# Patient Record
Sex: FEMALE | URBAN - METROPOLITAN AREA
[De-identification: names, ages, dates, MRNs, and addresses within clinical notes are randomized per-mention and may not be internally consistent; named-entity substitution may affect disease eponyms.]

---

## 2023-08-21 ENCOUNTER — WEB ENCOUNTER (OUTPATIENT)
Dept: URBAN - METROPOLITAN AREA CLINIC 54 | Facility: CLINIC | Age: 43
End: 2023-08-21

## 2023-08-23 ENCOUNTER — OFFICE VISIT (OUTPATIENT)
Dept: URBAN - METROPOLITAN AREA CLINIC 54 | Facility: CLINIC | Age: 43
End: 2023-08-23
Payer: COMMERCIAL

## 2023-08-23 ENCOUNTER — LAB OUTSIDE AN ENCOUNTER (OUTPATIENT)
Dept: URBAN - METROPOLITAN AREA CLINIC 54 | Facility: CLINIC | Age: 43
End: 2023-08-23

## 2023-08-23 ENCOUNTER — WEB ENCOUNTER (OUTPATIENT)
Dept: URBAN - METROPOLITAN AREA CLINIC 54 | Facility: CLINIC | Age: 43
End: 2023-08-23

## 2023-08-23 VITALS
BODY MASS INDEX: 28.97 KG/M2 | SYSTOLIC BLOOD PRESSURE: 112 MMHG | HEIGHT: 62 IN | HEART RATE: 72 BPM | WEIGHT: 157.4 LBS | TEMPERATURE: 97.1 F | DIASTOLIC BLOOD PRESSURE: 77 MMHG

## 2023-08-23 DIAGNOSIS — R19.7 ACUTE DIARRHEA: ICD-10-CM

## 2023-08-23 DIAGNOSIS — K59.01 SLOW TRANSIT CONSTIPATION: ICD-10-CM

## 2023-08-23 DIAGNOSIS — K21.9 GASTROESOPHAGEAL REFLUX DISEASE, UNSPECIFIED WHETHER ESOPHAGITIS PRESENT: ICD-10-CM

## 2023-08-23 PROBLEM — 35298007: Status: ACTIVE | Noted: 2023-08-23

## 2023-08-23 PROBLEM — 235595009: Status: ACTIVE | Noted: 2023-08-23

## 2023-08-23 PROCEDURE — 99215 OFFICE O/P EST HI 40 MIN: CPT | Performed by: PHYSICIAN ASSISTANT

## 2023-08-23 RX ORDER — TOPIRAMATE 100 MG/1
1 TABLET TABLET, FILM COATED ORAL ONCE A DAY
Status: ON HOLD | COMMUNITY

## 2023-08-23 RX ORDER — DICLOFENAC SODIUM 75 MG/1
1 TABLET AS NEEDED TABLET, DELAYED RELEASE ORAL TWICE A DAY
Status: ACTIVE | COMMUNITY

## 2023-08-23 RX ORDER — ACETAMINOPHEN,PHENIRAMINE MALEATE, PHENYLEPHRINE HYDROCHLORIDE 650; 20; 10 MG/15000MG; MG/15000MG; MG/15000MG
150 ML POWDER, FOR SUSPENSION ORAL ONCE
Qty: 1 | Refills: 0 | OUTPATIENT
Start: 2023-08-23 | End: 2023-08-24

## 2023-08-23 RX ORDER — SOD SULF/POT CHLORIDE/MAG SULF 1.479 G
12 TABLETS THE FIRST DOSE THE EVENING BEFORE AND SECOND DOSE THE MORNING OF COLONOSCOPY TABLET ORAL TWICE A DAY
Qty: 24 | Refills: 0 | OUTPATIENT
Start: 2023-08-23 | End: 2023-08-24

## 2023-08-23 RX ORDER — ALBUTEROL SULFATE 90 UG/1
1 PUFF AS NEEDED AEROSOL, METERED RESPIRATORY (INHALATION)
Status: ACTIVE | COMMUNITY

## 2023-08-23 RX ORDER — EPINEPHRINE 0.3 MG/.3ML
AS DIRECTED INJECTION INTRAMUSCULAR; SUBCUTANEOUS
Status: ACTIVE | COMMUNITY

## 2023-08-23 RX ORDER — VALACYCLOVIR HYDROCHLORIDE 500 MG/1
1 TABLET TABLET, FILM COATED ORAL ONCE A DAY
Status: ACTIVE | COMMUNITY

## 2023-08-23 RX ORDER — ONDANSETRON 8 MG/1
1 TABLET ON THE TONGUE AND ALLOW TO DISSOLVE  AS NEEDED TABLET, ORALLY DISINTEGRATING ORAL ONCE A DAY
Status: ACTIVE | COMMUNITY

## 2023-08-23 NOTE — HPI-TODAY'S VISIT:
Edelmira Childers is a 42-year old female pt with PMH of epilepsy,  asthma, pancreatitis, who presents to clinic today for constipation with abdominal CT imaging and possible colitis. Abdominal pain with nausea that prompted her to urgent care. Labs were unremarkable. CT abd/pelvis:  Large colonic fecal burden with mild thickening of the transverse segment, may reflect mild infectious/inflammatory colitis. Patient was given cipro & flagyl. She completed course of antibiotics and steroids and a few days later had hives with change in voice. She recieved epipen inj and was observed in [facility] overnight wihtout issues. Here today for follow up of the above events.

## 2023-09-07 ENCOUNTER — TELEPHONE ENCOUNTER (OUTPATIENT)
Dept: URBAN - METROPOLITAN AREA CLINIC 54 | Facility: CLINIC | Age: 43
End: 2023-09-07

## 2023-09-13 ENCOUNTER — OFFICE VISIT (OUTPATIENT)
Dept: URBAN - METROPOLITAN AREA MEDICAL CENTER 24 | Facility: MEDICAL CENTER | Age: 43
End: 2023-09-13
Payer: COMMERCIAL

## 2023-09-13 DIAGNOSIS — R93.3 ABN FINDINGS-GI TRACT: ICD-10-CM

## 2023-09-13 DIAGNOSIS — R12 BURNING REFLUX: ICD-10-CM

## 2023-09-13 PROCEDURE — 45378 DIAGNOSTIC COLONOSCOPY: CPT | Performed by: INTERNAL MEDICINE

## 2023-09-13 PROCEDURE — 43235 EGD DIAGNOSTIC BRUSH WASH: CPT | Performed by: INTERNAL MEDICINE

## 2023-09-13 RX ORDER — EPINEPHRINE 0.3 MG/.3ML
AS DIRECTED INJECTION INTRAMUSCULAR; SUBCUTANEOUS
Status: ACTIVE | COMMUNITY

## 2023-09-13 RX ORDER — VALACYCLOVIR HYDROCHLORIDE 500 MG/1
1 TABLET TABLET, FILM COATED ORAL ONCE A DAY
Status: ACTIVE | COMMUNITY

## 2023-09-13 RX ORDER — DICLOFENAC SODIUM 75 MG/1
1 TABLET AS NEEDED TABLET, DELAYED RELEASE ORAL TWICE A DAY
Status: ACTIVE | COMMUNITY

## 2023-09-13 RX ORDER — ONDANSETRON 8 MG/1
1 TABLET ON THE TONGUE AND ALLOW TO DISSOLVE  AS NEEDED TABLET, ORALLY DISINTEGRATING ORAL ONCE A DAY
Status: ACTIVE | COMMUNITY

## 2023-09-13 RX ORDER — ALBUTEROL SULFATE 90 UG/1
1 PUFF AS NEEDED AEROSOL, METERED RESPIRATORY (INHALATION)
Status: ACTIVE | COMMUNITY

## 2023-09-13 RX ORDER — TOPIRAMATE 100 MG/1
1 TABLET TABLET, FILM COATED ORAL ONCE A DAY
Status: ON HOLD | COMMUNITY

## 2023-09-14 ENCOUNTER — OFFICE VISIT (OUTPATIENT)
Dept: URBAN - METROPOLITAN AREA SURGERY CENTER 14 | Facility: SURGERY CENTER | Age: 43
End: 2023-09-14

## 2023-10-03 ENCOUNTER — OFFICE VISIT (OUTPATIENT)
Dept: URBAN - METROPOLITAN AREA CLINIC 54 | Facility: CLINIC | Age: 43
End: 2023-10-03

## 2023-10-03 RX ORDER — ALBUTEROL SULFATE 90 UG/1
1 PUFF AS NEEDED AEROSOL, METERED RESPIRATORY (INHALATION)
COMMUNITY

## 2023-10-03 RX ORDER — VALACYCLOVIR HYDROCHLORIDE 500 MG/1
1 TABLET TABLET, FILM COATED ORAL ONCE A DAY
COMMUNITY

## 2023-10-03 RX ORDER — ONDANSETRON 8 MG/1
1 TABLET ON THE TONGUE AND ALLOW TO DISSOLVE  AS NEEDED TABLET, ORALLY DISINTEGRATING ORAL ONCE A DAY
COMMUNITY

## 2023-10-03 RX ORDER — EPINEPHRINE 0.3 MG/.3ML
AS DIRECTED INJECTION INTRAMUSCULAR; SUBCUTANEOUS
COMMUNITY

## 2023-10-03 RX ORDER — DICLOFENAC SODIUM 75 MG/1
1 TABLET AS NEEDED TABLET, DELAYED RELEASE ORAL TWICE A DAY
COMMUNITY

## 2023-10-03 RX ORDER — TOPIRAMATE 100 MG/1
1 TABLET TABLET, FILM COATED ORAL ONCE A DAY
COMMUNITY

## 2023-10-03 NOTE — HPI-TODAY'S VISIT:
Edelmira Childers is a 42-year old female pt with PMH of epilepsy,  asthma, pancreatitis, who presents to clinic today for constipation with abdominal CT imaging and possible colitis. Abdominal pain with nausea that prompted her to urgent care. Labs were unremarkable. CT abd/pelvis:  Large colonic fecal burden with mild thickening of the transverse segment, may reflect mild infectious/inflammatory colitis. Patient was given cipro & flagyl. She completed course of antibiotics and steroids and a few days later had hives with change in voice. She recieved epipen inj and was observed in [facility] overnight wihtout issues. Here today for follow up of the above events.  10/3/23: Patient here for endoscopic follow-up.  She underwent EGD and colonoscopy with Dr. Green that showed normal EGD.  Esophageal and gastric biopsies show mild inflammation.  Negative for metaplasia, dysplasia, or H. pylori.  Colonoscopy was normal and recommended to repeat colonoscopy in 10 years for screening purposes.

## 2023-10-09 ENCOUNTER — OFFICE VISIT (OUTPATIENT)
Dept: URBAN - METROPOLITAN AREA CLINIC 54 | Facility: CLINIC | Age: 43
End: 2023-10-09
Payer: COMMERCIAL

## 2023-10-09 VITALS
WEIGHT: 161.4 LBS | SYSTOLIC BLOOD PRESSURE: 116 MMHG | HEIGHT: 62 IN | HEART RATE: 70 BPM | DIASTOLIC BLOOD PRESSURE: 73 MMHG | TEMPERATURE: 97.2 F | BODY MASS INDEX: 29.7 KG/M2

## 2023-10-09 DIAGNOSIS — K21.9 GASTROESOPHAGEAL REFLUX DISEASE, UNSPECIFIED WHETHER ESOPHAGITIS PRESENT: ICD-10-CM

## 2023-10-09 DIAGNOSIS — K59.09 CHANGE IN BOWEL MOVEMENTS INTERMITTENT CONSTIPATION. URGENCY IN THE MORNING.: ICD-10-CM

## 2023-10-09 DIAGNOSIS — R19.7 ACUTE DIARRHEA: ICD-10-CM

## 2023-10-09 PROBLEM — 82934008: Status: ACTIVE | Noted: 2023-10-09

## 2023-10-09 PROCEDURE — 99212 OFFICE O/P EST SF 10 MIN: CPT | Performed by: PHYSICIAN ASSISTANT

## 2023-10-09 RX ORDER — ONDANSETRON 8 MG/1
1 TABLET ON THE TONGUE AND ALLOW TO DISSOLVE  AS NEEDED TABLET, ORALLY DISINTEGRATING ORAL ONCE A DAY
Status: ACTIVE | COMMUNITY

## 2023-10-09 RX ORDER — DICLOFENAC SODIUM 75 MG/1
1 TABLET AS NEEDED TABLET, DELAYED RELEASE ORAL TWICE A DAY
Status: ACTIVE | COMMUNITY

## 2023-10-09 RX ORDER — VALACYCLOVIR HYDROCHLORIDE 500 MG/1
1 TABLET TABLET, FILM COATED ORAL ONCE A DAY
Status: ACTIVE | COMMUNITY

## 2023-10-09 RX ORDER — ALBUTEROL SULFATE 90 UG/1
1 PUFF AS NEEDED AEROSOL, METERED RESPIRATORY (INHALATION)
Status: ACTIVE | COMMUNITY

## 2023-10-09 RX ORDER — EPINEPHRINE 0.3 MG/.3ML
AS DIRECTED INJECTION INTRAMUSCULAR; SUBCUTANEOUS
Status: ACTIVE | COMMUNITY

## 2023-10-09 RX ORDER — PANTOPRAZOLE SODIUM 40 MG/1
1 TABLET TABLET, DELAYED RELEASE ORAL ONCE A DAY
Qty: 30 | OUTPATIENT
Start: 2023-10-09

## 2023-10-09 RX ORDER — TOPIRAMATE 100 MG/1
1 TABLET TABLET, FILM COATED ORAL ONCE A DAY
Status: ACTIVE | COMMUNITY

## 2023-10-09 NOTE — HPI-TODAY'S VISIT:
Edelmira Childers is a 42-year old female pt with PMH of epilepsy,  asthma, pancreatitis, who presents to clinic today for constipation with abdominal CT imaging and possible colitis. Abdominal pain with nausea that prompted her to urgent care. Labs were unremarkable. CT abd/pelvis:  Large colonic fecal burden with mild thickening of the transverse segment, may reflect mild infectious/inflammatory colitis. Patient was given cipro & flagyl. She completed course of antibiotics and steroids and a few days later had hives with change in voice. She recieved epipen inj and was observed in [facility] overnight wihtout issues. Here today for follow up of the above events.  10/9/23: Patient here for endoscopic follow-up.  She underwent EGD and colonoscopy with Dr. Green that showed normal EGD.  Esophageal and gastric biopsies show mild inflammation.  Negative for metaplasia, dysplasia, or H. pylori.  Colonoscopy was normal and recommended to repeat colonoscopy in 10 years for screening purposes.  Today, patient reports improvement  in constipation with daily MiraLAX.  She does report constipation reported presents with increased fiber.  She is currently on a 1200-calorie restriction by her provider and states she has significantly increased her vegetables at once and increased water intake.  She did require stimulant laxatives 1.5 weeks ago.  She reports taking omeprazole daily with minimal improvement in reflux.  Frequent throat clearing during OV today.  No esophagitis noted on EGD.  On NSAIDs for OA.

## 2023-11-08 ENCOUNTER — ERX REFILL RESPONSE (OUTPATIENT)
Dept: URBAN - NONMETROPOLITAN AREA CLINIC 4 | Facility: CLINIC | Age: 43
End: 2023-11-08

## 2023-11-08 RX ORDER — PANTOPRAZOLE SODIUM 40 MG/1
TAKE 1 TABLET BY MOUTH EVERY DAY TABLET, DELAYED RELEASE ORAL
Qty: 90 TABLET | Refills: 2 | OUTPATIENT

## 2023-11-08 RX ORDER — PANTOPRAZOLE SODIUM 40 MG/1
1 TABLET TABLET, DELAYED RELEASE ORAL ONCE A DAY
Qty: 30 | OUTPATIENT

## 2024-04-09 ENCOUNTER — OV EP (OUTPATIENT)
Dept: URBAN - METROPOLITAN AREA CLINIC 54 | Facility: CLINIC | Age: 44
End: 2024-04-09
Payer: COMMERCIAL

## 2024-04-09 VITALS
WEIGHT: 147 LBS | SYSTOLIC BLOOD PRESSURE: 117 MMHG | HEIGHT: 62 IN | TEMPERATURE: 97.3 F | BODY MASS INDEX: 27.05 KG/M2 | HEART RATE: 77 BPM | DIASTOLIC BLOOD PRESSURE: 83 MMHG

## 2024-04-09 DIAGNOSIS — K52.89 (LYMPHOCYTIC) MICROSCOPIC COLITIS: ICD-10-CM

## 2024-04-09 DIAGNOSIS — K21.9 GASTROESOPHAGEAL REFLUX DISEASE, UNSPECIFIED WHETHER ESOPHAGITIS PRESENT: ICD-10-CM

## 2024-04-09 DIAGNOSIS — K59.09 CHANGE IN BOWEL MOVEMENTS INTERMITTENT CONSTIPATION. URGENCY IN THE MORNING.: ICD-10-CM

## 2024-04-09 PROCEDURE — 99214 OFFICE O/P EST MOD 30 MIN: CPT | Performed by: PHYSICIAN ASSISTANT

## 2024-04-09 RX ORDER — VALACYCLOVIR HYDROCHLORIDE 500 MG/1
1 TABLET TABLET, FILM COATED ORAL ONCE A DAY
Status: ACTIVE | COMMUNITY

## 2024-04-09 RX ORDER — ALBUTEROL SULFATE 90 UG/1
1 PUFF AS NEEDED AEROSOL, METERED RESPIRATORY (INHALATION)
Status: ACTIVE | COMMUNITY

## 2024-04-09 RX ORDER — PANTOPRAZOLE SODIUM 40 MG/1
TAKE 1 TABLET BY MOUTH EVERY DAY TABLET, DELAYED RELEASE ORAL
Qty: 90 TABLET | Refills: 2 | Status: ACTIVE | COMMUNITY

## 2024-04-09 RX ORDER — ONDANSETRON 8 MG/1
1 TABLET ON THE TONGUE AND ALLOW TO DISSOLVE  AS NEEDED TABLET, ORALLY DISINTEGRATING ORAL ONCE A DAY
Status: ACTIVE | COMMUNITY

## 2024-04-09 RX ORDER — EPINEPHRINE 0.3 MG/.3ML
AS DIRECTED INJECTION INTRAMUSCULAR; SUBCUTANEOUS
Status: ACTIVE | COMMUNITY

## 2024-04-09 RX ORDER — PHENTERMINE AND TOPIRAMATE 11.25; 69 MG/1; MG/1
1 CAPSULE CAPSULE, EXTENDED RELEASE ORAL ONCE A DAY
Status: ACTIVE | COMMUNITY

## 2024-04-09 RX ORDER — PANTOPRAZOLE SODIUM 40 MG/1
1 TABLET TABLET, DELAYED RELEASE ORAL ONCE A DAY
Qty: 30 | Refills: 5 | OUTPATIENT

## 2024-04-09 RX ORDER — HYOSCYAMINE SULFATE 0.12 MG/1
1 TABLET AS NEEDED TABLET ORAL THREE TIMES A DAY
Qty: 90 TABLET | Refills: 3 | OUTPATIENT
Start: 2024-04-09 | End: 2024-08-07

## 2024-04-09 NOTE — HPI-TODAY'S VISIT:
Edelmira Childers is a 42-year old female pt with PMH of epilepsy,  asthma, pancreatitis, who presents to clinic today for constipation with abdominal CT imaging and possible colitis. Abdominal pain with nausea that prompted her to urgent care. Labs were unremarkable. CT abd/pelvis:  Large colonic fecal burden with mild thickening of the transverse segment, may reflect mild infectious/inflammatory colitis. Patient was given cipro & flagyl. She completed course of antibiotics and steroids and a few days later had hives with change in voice. She recieved epipen inj and was observed in [facility] overnight wihtout issues. Here today for follow up of the above events.  10/9/23: Patient here for endoscopic follow-up.  She underwent EGD and colonoscopy with Dr. Green that showed normal EGD.  Esophageal and gastric biopsies show mild inflammation.  Negative for metaplasia, dysplasia, or H. pylori.  Colonoscopy was normal and recommended to repeat colonoscopy in 10 years for screening purposes.  Today, patient reports improvement  in constipation with daily MiraLAX.  She does report constipation reported presents with increased fiber.  She is currently on a 1200-calorie restriction by her provider and states she has significantly increased her vegetables at once and increased water intake.  She did require stimulant laxatives 1.5 weeks ago.  She reports taking omeprazole daily with minimal improvement in reflux.  Frequent throat clearing during OV today.  No esophagitis noted on EGD.  On NSAIDs for OA.  Follow Up 4/9/24: Patient presents for routine follow up. Significant improvement with pantoprazole 40 QD. She complains of alternating between constipation and diarrhea. She takes Metamucil and 1 cap of MiraLAX intermittently during times of constipation. She also notes occasional epigastric pain that radiates through to her back.  Patient states this is typically triggered by opiates and particular foods.  1 episode in 2019 lasted 3 days which prompted her to her PCP who noted significantly elevated transaminases but normal lipase.  MRCP obtained 2 months later without significant findings.

## 2024-08-26 ENCOUNTER — P2P PATIENT RECORD (OUTPATIENT)
Age: 44
End: 2024-08-26

## 2024-09-05 ENCOUNTER — TELEPHONE ENCOUNTER (OUTPATIENT)
Dept: URBAN - METROPOLITAN AREA CLINIC 82 | Facility: CLINIC | Age: 44
End: 2024-09-05

## 2024-09-09 ENCOUNTER — DASHBOARD ENCOUNTERS (OUTPATIENT)
Age: 44
End: 2024-09-09

## 2024-09-09 ENCOUNTER — OFFICE VISIT (OUTPATIENT)
Dept: URBAN - METROPOLITAN AREA CLINIC 54 | Facility: CLINIC | Age: 44
End: 2024-09-09
Payer: COMMERCIAL

## 2024-09-09 ENCOUNTER — LAB OUTSIDE AN ENCOUNTER (OUTPATIENT)
Dept: URBAN - METROPOLITAN AREA CLINIC 54 | Facility: CLINIC | Age: 44
End: 2024-09-09

## 2024-09-09 VITALS
HEIGHT: 62 IN | BODY MASS INDEX: 25.54 KG/M2 | SYSTOLIC BLOOD PRESSURE: 118 MMHG | HEART RATE: 81 BPM | DIASTOLIC BLOOD PRESSURE: 77 MMHG | WEIGHT: 138.8 LBS | TEMPERATURE: 97.3 F

## 2024-09-09 DIAGNOSIS — K59.04 CHRONIC IDIOPATHIC CONSTIPATION: ICD-10-CM

## 2024-09-09 DIAGNOSIS — K52.9 COLITIS: ICD-10-CM

## 2024-09-09 DIAGNOSIS — R10.13 EPIGASTRIC PAIN: ICD-10-CM

## 2024-09-09 DIAGNOSIS — K21.9 GASTROESOPHAGEAL REFLUX DISEASE, UNSPECIFIED WHETHER ESOPHAGITIS PRESENT: ICD-10-CM

## 2024-09-09 PROCEDURE — 99214 OFFICE O/P EST MOD 30 MIN: CPT | Performed by: PHYSICIAN ASSISTANT

## 2024-09-09 RX ORDER — PANTOPRAZOLE SODIUM 40 MG/1
1 TABLET TABLET, DELAYED RELEASE ORAL ONCE A DAY
Qty: 30 | Refills: 5 | OUTPATIENT

## 2024-09-09 RX ORDER — CHLORHEXIDINE GLUCONATE 4 %
AS DIRECTED LIQUID (ML) TOPICAL
Status: ACTIVE | COMMUNITY

## 2024-09-09 RX ORDER — ONDANSETRON 8 MG/1
1 TABLET ON THE TONGUE AND ALLOW TO DISSOLVE  AS NEEDED TABLET, ORALLY DISINTEGRATING ORAL ONCE A DAY
Status: ACTIVE | COMMUNITY

## 2024-09-09 RX ORDER — EPINEPHRINE 0.3 MG/.3ML
AS DIRECTED INJECTION INTRAMUSCULAR; SUBCUTANEOUS
Status: ACTIVE | COMMUNITY

## 2024-09-09 RX ORDER — VALACYCLOVIR HYDROCHLORIDE 500 MG/1
1 TABLET TABLET, FILM COATED ORAL ONCE A DAY
Status: ACTIVE | COMMUNITY

## 2024-09-09 RX ORDER — ALBUTEROL SULFATE 90 UG/1
1 PUFF AS NEEDED AEROSOL, METERED RESPIRATORY (INHALATION)
Status: ACTIVE | COMMUNITY

## 2024-09-09 RX ORDER — PHENTERMINE AND TOPIRAMATE 11.25; 69 MG/1; MG/1
1 CAPSULE CAPSULE, EXTENDED RELEASE ORAL ONCE A DAY
Status: ACTIVE | COMMUNITY

## 2024-09-09 RX ORDER — HYOSCYAMINE SULFATE 0.12 MG/1
1 TABLET AS NEEDED TABLET ORAL
Status: ACTIVE | COMMUNITY

## 2024-09-09 RX ORDER — PANTOPRAZOLE SODIUM 40 MG/1
TAKE 1 TABLET BY MOUTH EVERY DAY TABLET, DELAYED RELEASE ORAL
Qty: 90 TABLET | Refills: 2 | Status: ACTIVE | COMMUNITY

## 2024-09-09 RX ORDER — LACTULOSE 10 G/15ML
15 ML AS NEEDED SOLUTION ORAL ONCE A DAY
Status: ACTIVE | COMMUNITY

## 2024-09-09 NOTE — HPI-TODAY'S VISIT:
Edelmira Childers is a 42-year old female pt with PMH of epilepsy,  asthma, pancreatitis, who presents to clinic today for constipation with abdominal CT imaging and possible colitis. Abdominal pain with nausea that prompted her to urgent care. Labs were unremarkable. CT abd/pelvis:  Large colonic fecal burden with mild thickening of the transverse segment, may reflect mild infectious/inflammatory colitis. Patient was given cipro & flagyl. She completed course of antibiotics and steroids and a few days later had hives with change in voice. She recieved epipen inj and was observed in [facility] overnight wihtout issues. Here today for follow up of the above events.  10/9/23: Patient here for endoscopic follow-up.  She underwent EGD and colonoscopy with Dr. Green that showed normal EGD.  Esophageal and gastric biopsies show mild inflammation.  Negative for metaplasia, dysplasia, or H. pylori.  Colonoscopy was normal and recommended to repeat colonoscopy in 10 years for screening purposes.  Today, patient reports improvement  in constipation with daily MiraLAX.  She does report constipation reported presents with increased fiber.  She is currently on a 1200-calorie restriction by her provider and states she has significantly increased her vegetables at once and increased water intake.  She did require stimulant laxatives 1.5 weeks ago.  She reports taking omeprazole daily with minimal improvement in reflux.  Frequent throat clearing during OV today.  No esophagitis noted on EGD.  On NSAIDs for OA.  Follow Up 4/9/24: Patient presents for routine follow up. Significant improvement with pantoprazole 40 QD. She complains of alternating between constipation and diarrhea. She takes Metamucil and 1 cap of MiraLAX intermittently during times of constipation. She also notes occasional epigastric pain that radiates through to her back.  Patient states this is typically triggered by opiates and particular foods.  1 episode in 2019 lasted 3 days which prompted her to her PCP who noted significantly elevated transaminases but normal lipase.  MRCP obtained 2 months later without significant findings.  Follow Up 9/9/24: Pt presents for routine follow up. Pt reports doing well over the last few months. A few days prior to arrival, she had epigastric pain that radiates to the back. She took antispasmodic with alleviation of pain in approx 30 minutes. Symptoms are improving. Pt is currently taking acetaminophen, dextromethorphan, guafenesiin, phenylephrine. Remains on PPI. Now with regular BMs.

## 2024-09-14 LAB
ALBUMIN: 4.5
ALKALINE PHOSPHATASE: 53
ALT (SGPT): 28
AST (SGOT): 13
BASO (ABSOLUTE): 0
BASOS: 0
BILIRUBIN, TOTAL: 0.5
BUN/CREATININE RATIO: 14
BUN: 11
CALCIUM: 9.3
CARBON DIOXIDE, TOTAL: 18
CHLORIDE: 106
CREATININE: 0.77
EGFR: 97
EOS (ABSOLUTE): 0.1
EOS: 2
GLOBULIN, TOTAL: 2.5
GLUCOSE: 74
HEMATOCRIT: 43.4
HEMATOLOGY COMMENTS:: (no result)
HEMOGLOBIN: 14.6
IMMATURE CELLS: (no result)
IMMATURE GRANS (ABS): 0
IMMATURE GRANULOCYTES: 0
LIPASE: 17
LYMPHS (ABSOLUTE): 2.5
LYMPHS: 31
MCH: 31.1
MCHC: 33.6
MCV: 93
MONOCYTES(ABSOLUTE): 0.3
MONOCYTES: 4
NEUTROPHILS (ABSOLUTE): 5.1
NEUTROPHILS: 63
NRBC: (no result)
PLATELETS: 299
POTASSIUM: 4
PROTEIN, TOTAL: 7
RBC: 4.69
RDW: 11.7
SODIUM: 141
WBC: 8.1

## 2024-09-18 ENCOUNTER — WEB ENCOUNTER (OUTPATIENT)
Dept: URBAN - METROPOLITAN AREA CLINIC 54 | Facility: CLINIC | Age: 44
End: 2024-09-18

## 2024-10-08 ENCOUNTER — OFFICE VISIT (OUTPATIENT)
Dept: URBAN - METROPOLITAN AREA CLINIC 54 | Facility: CLINIC | Age: 44
End: 2024-10-08
Payer: COMMERCIAL

## 2024-10-08 VITALS
SYSTOLIC BLOOD PRESSURE: 124 MMHG | HEIGHT: 62 IN | BODY MASS INDEX: 25.03 KG/M2 | TEMPERATURE: 97.3 F | HEART RATE: 75 BPM | DIASTOLIC BLOOD PRESSURE: 84 MMHG | WEIGHT: 136 LBS

## 2024-10-08 DIAGNOSIS — K21.9 GASTROESOPHAGEAL REFLUX DISEASE, UNSPECIFIED WHETHER ESOPHAGITIS PRESENT: ICD-10-CM

## 2024-10-08 DIAGNOSIS — K52.89 OTHER SPECIFIED NONINFECTIVE GASTROENTERITIS AND COLITIS: ICD-10-CM

## 2024-10-08 DIAGNOSIS — K59.04 CHRONIC IDIOPATHIC CONSTIPATION: ICD-10-CM

## 2024-10-08 PROCEDURE — 99214 OFFICE O/P EST MOD 30 MIN: CPT | Performed by: PHYSICIAN ASSISTANT

## 2024-10-08 RX ORDER — HYOSCYAMINE SULFATE 0.12 MG/1
1 TABLET AS NEEDED TABLET ORAL
Status: ACTIVE | COMMUNITY

## 2024-10-08 RX ORDER — ONDANSETRON 8 MG/1
1 TABLET ON THE TONGUE AND ALLOW TO DISSOLVE  AS NEEDED TABLET, ORALLY DISINTEGRATING ORAL ONCE A DAY
Status: ACTIVE | COMMUNITY

## 2024-10-08 RX ORDER — ALBUTEROL SULFATE 90 UG/1
1 PUFF AS NEEDED AEROSOL, METERED RESPIRATORY (INHALATION)
Status: ACTIVE | COMMUNITY

## 2024-10-08 RX ORDER — EPINEPHRINE 0.3 MG/.3ML
AS DIRECTED INJECTION INTRAMUSCULAR; SUBCUTANEOUS
Status: ACTIVE | COMMUNITY

## 2024-10-08 RX ORDER — PANTOPRAZOLE SODIUM 40 MG/1
TAKE 1 TABLET BY MOUTH EVERY DAY TABLET, DELAYED RELEASE ORAL
Qty: 90 TABLET | Refills: 2 | Status: ACTIVE | COMMUNITY

## 2024-10-08 RX ORDER — LACTULOSE 10 G/15ML
15 ML AS NEEDED SOLUTION ORAL ONCE A DAY
Status: ACTIVE | COMMUNITY

## 2024-10-08 RX ORDER — VALACYCLOVIR HYDROCHLORIDE 500 MG/1
1 TABLET TABLET, FILM COATED ORAL ONCE A DAY
Status: ACTIVE | COMMUNITY

## 2024-10-08 RX ORDER — PHENTERMINE AND TOPIRAMATE 11.25; 69 MG/1; MG/1
1 CAPSULE CAPSULE, EXTENDED RELEASE ORAL ONCE A DAY
Status: ACTIVE | COMMUNITY

## 2024-10-08 RX ORDER — CHLORHEXIDINE GLUCONATE 4 %
AS DIRECTED LIQUID (ML) TOPICAL
Status: ACTIVE | COMMUNITY

## 2024-10-08 NOTE — HPI-TODAY'S VISIT:
Edelmira Childers is a 42-year old female pt with PMH of epilepsy,  asthma, pancreatitis, who presents to clinic today for constipation with abdominal CT imaging and possible colitis. Abdominal pain with nausea that prompted her to urgent care. Labs were unremarkable. CT abd/pelvis:  Large colonic fecal burden with mild thickening of the transverse segment, may reflect mild infectious/inflammatory colitis. Patient was given cipro & flagyl. She completed course of antibiotics and steroids and a few days later had hives with change in voice. She recieved epipen inj and was observed in [facility] overnight wihtout issues. Here today for follow up of the above events.  10/9/23: Patient here for endoscopic follow-up.  She underwent EGD and colonoscopy with Dr. Green that showed normal EGD.  Esophageal and gastric biopsies show mild inflammation.  Negative for metaplasia, dysplasia, or H. pylori.  Colonoscopy was normal and recommended to repeat colonoscopy in 10 years for screening purposes.  Today, patient reports improvement  in constipation with daily MiraLAX.  She does report constipation reported presents with increased fiber.  She is currently on a 1200-calorie restriction by her provider and states she has significantly increased her vegetables at once and increased water intake.  She did require stimulant laxatives 1.5 weeks ago.  She reports taking omeprazole daily with minimal improvement in reflux.  Frequent throat clearing during OV today.  No esophagitis noted on EGD.  On NSAIDs for OA.  Follow Up 4/9/24: Patient presents for routine follow up. Significant improvement with pantoprazole 40 QD. She complains of alternating between constipation and diarrhea. She takes Metamucil and 1 cap of MiraLAX intermittently during times of constipation. She also notes occasional epigastric pain that radiates through to her back.  Patient states this is typically triggered by opiates and particular foods.  1 episode in 2019 lasted 3 days which prompted her to her PCP who noted significantly elevated transaminases but normal lipase.  MRCP obtained 2 months later without significant findings.  Follow Up 9/9/24: Pt presents for routine follow up. Pt reports doing well over the last few months. A few days prior to arrival, she had epigastric pain that radiates to the back. She took antispasmodic with alleviation of pain in approx 30 minutes. Symptoms are improving. Pt is currently taking acetaminophen, dextromethorphan, guafenesiin, phenylephrine. Remains on PPI. Now with regular BMs.  Follow Up 10/8/24: Pt presents for routine follow up. CBC, CMP, and lipase were unremarkable during last OV after recent episode of pain. Negative EGD and colonoscopy. MRCP was normal except moderate fecal burden. Pt takes Metamucil + MiraLAX with approx 2 BMs daily. She admits to poor PO intake due to work schedule with some increased indigestion at night. On PPI.

## 2025-01-13 ENCOUNTER — TELEPHONE ENCOUNTER (OUTPATIENT)
Dept: URBAN - METROPOLITAN AREA CLINIC 54 | Facility: CLINIC | Age: 45
End: 2025-01-13

## 2025-04-08 ENCOUNTER — OFFICE VISIT (OUTPATIENT)
Dept: URBAN - METROPOLITAN AREA CLINIC 54 | Facility: CLINIC | Age: 45
End: 2025-04-08

## 2025-04-14 ENCOUNTER — OFFICE VISIT (OUTPATIENT)
Dept: URBAN - METROPOLITAN AREA CLINIC 54 | Facility: CLINIC | Age: 45
End: 2025-04-14

## 2025-06-10 ENCOUNTER — P2P PATIENT RECORD (OUTPATIENT)
Age: 45
End: 2025-06-10

## 2025-08-17 ENCOUNTER — ERX REFILL RESPONSE (OUTPATIENT)
Dept: URBAN - NONMETROPOLITAN AREA CLINIC 4 | Facility: CLINIC | Age: 45
End: 2025-08-17

## 2025-08-17 RX ORDER — PANTOPRAZOLE SODIUM 40 MG/1
TAKE 1 TABLET BY MOUTH EVERY DAY TABLET, DELAYED RELEASE ORAL
Qty: 90 TABLET | Refills: 2 | OUTPATIENT

## 2025-08-17 RX ORDER — PANTOPRAZOLE SODIUM 40 MG/1
1 TABLET 1/2 TO 1 HOUR BEFORE MORNING MEAL TABLET, DELAYED RELEASE ORAL ONCE A DAY
Qty: 90 TABLET | Refills: 3 | OUTPATIENT